# Patient Record
Sex: FEMALE | Race: WHITE | URBAN - METROPOLITAN AREA
[De-identification: names, ages, dates, MRNs, and addresses within clinical notes are randomized per-mention and may not be internally consistent; named-entity substitution may affect disease eponyms.]

---

## 2018-10-08 ENCOUNTER — IMPORTED ENCOUNTER (OUTPATIENT)
Dept: URBAN - METROPOLITAN AREA CLINIC 50 | Facility: CLINIC | Age: 73
End: 2018-10-08

## 2018-10-08 NOTE — PATIENT DISCUSSION
"""Informed patient that their capsular opacification is visually significant and meets the minimum ""

## 2019-01-09 ENCOUNTER — IMPORTED ENCOUNTER (OUTPATIENT)
Dept: URBAN - METROPOLITAN AREA CLINIC 50 | Facility: CLINIC | Age: 74
End: 2019-01-09

## 2019-01-09 NOTE — PATIENT DISCUSSION
"""Type 2 diabetes without diabetic retinopathy. Emphasized importance of maintaining a healthy diet and regular exercise.  Encouraged patient to continue self-monitoring blood sugar

## 2019-10-22 NOTE — PATIENT DISCUSSION
PROLIFERATIVE DIABETIC RETINOPATHY, OS&gt;OD__: REFER TO RETINA SPECIALIST FOR EVALUATION AND TREATMENT.

## 2020-12-07 ENCOUNTER — IMPORTED ENCOUNTER (OUTPATIENT)
Dept: URBAN - METROPOLITAN AREA CLINIC 50 | Facility: CLINIC | Age: 75
End: 2020-12-07

## 2021-01-18 ENCOUNTER — IMPORTED ENCOUNTER (OUTPATIENT)
Dept: URBAN - METROPOLITAN AREA CLINIC 50 | Facility: CLINIC | Age: 76
End: 2021-01-18

## 2021-01-27 NOTE — PATIENT DISCUSSION
RECOMMEND CATARACT SURGERY ONCE NPDR IS STABLIZED. REFERRING DR Tony Greenberg FOR MISA AND TREATMENT OF NPDR.

## 2021-01-27 NOTE — PATIENT DISCUSSION
CATARACTS, OU - WORSENING / VISUALLY SIGNIFICANT. PT TO CALL WHEN READY TO PROCEED WITH PREOP AND SURGERY. SCHEDULE OU.

## 2021-04-17 ASSESSMENT — TONOMETRY
OD_IOP_MMHG: 12
OD_IOP_MMHG: 15
OS_IOP_MMHG: 15
OD_IOP_MMHG: 15
OS_IOP_MMHG: 16
OS_IOP_MMHG: 12

## 2021-04-17 ASSESSMENT — VISUAL ACUITY
OD_OTHER: 20/60-. >20/400.
OD_CC: J2
OD_CC: 20/25-
OS_OTHER: 20/50. 20/100.
OS_CC: J1@ 18 IN
OS_CC: J1+
OD_CC: J1+
OS_CC: 20/20-1
OS_BAT: 20/50
OD_CC: J1@ 18 IN
OD_BAT: 20/60-
OS_CC: 20/25-2
OS_CC: J2
OS_CC: 20/25
OD_CC: J1+
OD_CC: 20/30-1
OD_CC: 20/25
OD_CC: 20/20-1
OS_CC: 20/25
OS_CC: J1+

## 2021-04-21 NOTE — PATIENT DISCUSSION
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 Start Warm Compresses OU BID, Increase ATs OU BID-QID, Lid scrubs after warm compresses.  Start Erythromycin OU qhs.

## 2021-05-21 NOTE — PATIENT DISCUSSION
Recommend patient stop face cream (Metronidazole) to see if she is having a reaction to this medication.

## 2021-05-21 NOTE — PATIENT DISCUSSION
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 Start Warm Compresses OU BID, Increase ATs OU BID-QID, Lid scrubs after warm compresses.  Continue Erythromycin OU qhs.

## 2021-09-08 NOTE — PATIENT DISCUSSION
SEE DR Kanwal Espinosa FOR CAT SX CLEARANCE AND EVAL OF NPDR OU AND THEN [address] PT FOR PREOP.

## 2021-10-26 NOTE — PATIENT DISCUSSION
Surgery Counseling: I have discussed the option of scheduling surgery versus following, as well as the risks, benefits and alternatives of cataract surgery with the patient.  It was explained that the surgery is medically indicated at this time, and it can be performed at the patient's option as delaying will cause no further deterioration, therefore there is no rush and there is no harm in waiting to have surgery. It was also explained that there is no guarantee that removing the cataract will improve their vision.  The patient understands and desires to proceed with cataract surgery with the implantation of an intraocular lens to improve vision for __WATCHING TV___.  I have given the patient the prescribed regimen of the all-in-one drop to use before and after cataract surgery. They have elected to use the all-in-one option of Pred/Gati/Brom(prednisolone acetate,gatifloxacin,and bromfenac. Patient to administer as directed.

## 2021-10-26 NOTE — PATIENT DISCUSSION
CATARACTS, OU - WORSENING/VISUALLY SIGNIFICANT.  SCHEDULE OS__ FIRST THEN LATER IN _OD_ DISCUSSED OPTION OF ____STANDARD OU ___. PATIENT UNDERSTANDS AND DESIRES ___STANDARD OU___.

## 2021-12-08 ENCOUNTER — PREPPED CHART (OUTPATIENT)
Dept: URBAN - METROPOLITAN AREA CLINIC 50 | Facility: CLINIC | Age: 76
End: 2021-12-08

## 2022-02-10 ENCOUNTER — ESTABLISHED PATIENT (OUTPATIENT)
Dept: URBAN - METROPOLITAN AREA CLINIC 50 | Facility: CLINIC | Age: 77
End: 2022-02-10

## 2022-02-10 DIAGNOSIS — H43.813: ICD-10-CM

## 2022-02-10 DIAGNOSIS — H02.831: ICD-10-CM

## 2022-02-10 DIAGNOSIS — H04.123: ICD-10-CM

## 2022-02-10 DIAGNOSIS — H11.153: ICD-10-CM

## 2022-02-10 DIAGNOSIS — H43.393: ICD-10-CM

## 2022-02-10 DIAGNOSIS — H02.834: ICD-10-CM

## 2022-02-10 PROCEDURE — 92014 COMPRE OPH EXAM EST PT 1/>: CPT

## 2022-02-10 PROCEDURE — 92134 CPTRZ OPH DX IMG PST SGM RTA: CPT

## 2022-02-10 ASSESSMENT — VISUAL ACUITY
OS_CC: 20/25
OD_CC: 20/25
OU_CC: J1@16IN
OU_CC: 20/25+1

## 2022-02-10 ASSESSMENT — TONOMETRY
OS_IOP_MMHG: 15
OD_IOP_MMHG: 15

## 2023-04-10 ENCOUNTER — COMPREHENSIVE EXAM (OUTPATIENT)
Dept: URBAN - METROPOLITAN AREA CLINIC 50 | Facility: CLINIC | Age: 78
End: 2023-04-10

## 2023-04-10 DIAGNOSIS — H04.123: ICD-10-CM

## 2023-04-10 DIAGNOSIS — H02.834: ICD-10-CM

## 2023-04-10 DIAGNOSIS — H43.813: ICD-10-CM

## 2023-04-10 DIAGNOSIS — H52.4: ICD-10-CM

## 2023-04-10 DIAGNOSIS — H11.153: ICD-10-CM

## 2023-04-10 DIAGNOSIS — E11.9: ICD-10-CM

## 2023-04-10 DIAGNOSIS — H43.393: ICD-10-CM

## 2023-04-10 DIAGNOSIS — H02.831: ICD-10-CM

## 2023-04-10 PROCEDURE — 92015 DETERMINE REFRACTIVE STATE: CPT

## 2023-04-10 PROCEDURE — 92134 CPTRZ OPH DX IMG PST SGM RTA: CPT

## 2023-04-10 PROCEDURE — 92014 COMPRE OPH EXAM EST PT 1/>: CPT

## 2023-04-10 ASSESSMENT — VISUAL ACUITY
OS_CC: 20/25
OU_CC: J1+@16"
OD_CC: 20/25+1
OU_CC: 20/20

## 2023-04-10 ASSESSMENT — TONOMETRY
OD_IOP_MMHG: 14
OS_IOP_MMHG: 14